# Patient Record
Sex: FEMALE | Race: WHITE | NOT HISPANIC OR LATINO | URBAN - NONMETROPOLITAN AREA
[De-identification: names, ages, dates, MRNs, and addresses within clinical notes are randomized per-mention and may not be internally consistent; named-entity substitution may affect disease eponyms.]

---

## 2025-04-17 ENCOUNTER — OFFICE VISIT (OUTPATIENT)
Dept: URGENT CARE | Age: 45
End: 2025-04-17
Payer: COMMERCIAL

## 2025-04-17 ENCOUNTER — ANCILLARY PROCEDURE (OUTPATIENT)
Dept: URGENT CARE | Age: 45
End: 2025-04-17
Payer: COMMERCIAL

## 2025-04-17 VITALS
BODY MASS INDEX: 26.66 KG/M2 | HEART RATE: 74 BPM | DIASTOLIC BLOOD PRESSURE: 78 MMHG | SYSTOLIC BLOOD PRESSURE: 109 MMHG | HEIGHT: 65 IN | TEMPERATURE: 97.7 F | OXYGEN SATURATION: 100 % | RESPIRATION RATE: 16 BRPM | WEIGHT: 160 LBS

## 2025-04-17 DIAGNOSIS — M25.531 PAIN IN RIGHT WRIST: ICD-10-CM

## 2025-04-17 DIAGNOSIS — S63.501A SPRAIN OF RIGHT WRIST, INITIAL ENCOUNTER: ICD-10-CM

## 2025-04-17 DIAGNOSIS — Q74.0 CONGENITAL POSITIVE ULNAR VARIANCE OF RIGHT WRIST: Primary | ICD-10-CM

## 2025-04-17 PROCEDURE — 73110 X-RAY EXAM OF WRIST: CPT | Mod: RIGHT SIDE

## 2025-04-17 ASSESSMENT — PAIN SCALES - GENERAL: PAINLEVEL_OUTOF10: 1

## 2025-04-17 NOTE — PATIENT INSTRUCTIONS
Follow RICE instructions.  Rotate Tylenol/ibuprofen as needed for pain following dosing instructions.  If symptoms worsen we develop any new concerning worrisome symptoms such as but not limited to numbness/weakness/pain out of proportion please go to the ER.  Call referral number today or tomorrow morning to set up appointment with orthopedist for reassessment.

## 2025-04-17 NOTE — PROGRESS NOTES
"Subjective   Patient ID: Yoon Dumont is a 44 y.o. female. 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 Pain comes and goes.).    History of Present Illness  44-year-old female presents urgent care for complaint of right wrist pain started around 2 months ago when she was spotting a teenager at gymnastics helping her get up on a bar.  States there was no direct injury or trauma just that the pain began shortly after this and progressed over the next couple days.  States pain is around a 1/10 intensity currently but sometimes fluctuates up to a 4/10 intensity.  Patient has full range of motion and sensation of bilateral upper extremities.  She states the pain sometimes radiates up to her right elbow and to her right index and middle fingers.  Denies any fevers or chills, nausea vomiting or sweats or any other concerns at this time.  Radiologist impression of the right wrist x-ray states \"The 2nd point impression should read as follows: \"Positive ulnar variance measuring 2 mm which could be associated triangular fibrocartilage complex degenerative tears and ulnocarpal impaction syndrome. No osseous changes of the latter identified radiographically.\".  Placed in splint.  Ortho referral.  RICE instructions, rotate Tylenol/ibuprofen for additional instructions as needed for pain.  ER precautions.  Patient agrees with plan.            Past Medical History  Allergies as of 04/17/2025    (No Known Allergies)       Prescriptions Prior to Admission[1]     Medical History[2]    Surgical History[3]     reports that she has never smoked. She has never used smokeless tobacco. She reports current alcohol use. She reports that she does not use drugs.    Review of Systems  Review of Systems   All other systems reviewed and are negative.                                 Objective    Vitals:    04/17/25 1634   Weight: 72.6 kg (160 lb)   Height: 1.651 m (5' 5\") "     Patient's last menstrual period was 03/24/2025.    Physical Exam  Vitals reviewed.   Constitutional:       General: She is not in acute distress.     Appearance: Normal appearance. She is not ill-appearing, toxic-appearing or diaphoretic.   HENT:      Head: Normocephalic and atraumatic.      Nose: Nose normal.   Cardiovascular:      Rate and Rhythm: Normal rate and regular rhythm.   Pulmonary:      Effort: Pulmonary effort is normal. No respiratory distress.   Musculoskeletal:      Cervical back: Normal range of motion and neck supple.      Comments: No obvious bony deformity or overlying skin changes to the right wrist/hand/forearm.  Tenderness greatest at the right ulnar styloid.  Bilateral median, ulnar, radial nerves intact motor and sensory.  Bilateral radial pulses intact and symmetrical.  Equal  strength bilaterally.  No tenderness to the mid or proximal right forearm/elbow or shoulder.  Left upper extremity unremarkable.   Skin:     General: Skin is warm and dry.   Neurological:      General: No focal deficit present.      Mental Status: She is alert and oriented to person, place, and time.   Psychiatric:         Mood and Affect: Mood normal.         Behavior: Behavior normal.         Procedures    Point of Care Test & Imaging Results from this visit  No results found for this visit on 04/17/25.   Imaging  No results found.    Cardiology, Vascular, and Other Imaging  No other imaging results found for the past 2 days      Diagnostic study results (if any) were reviewed by Jaimie Potts PA-C.    Assessment/Plan   Allergies, medications, history, and pertinent labs/EKGs/Imaging reviewed by Jaimie Potts PA-C.     Medical Decision Making  44-year-old female presents urgent care for complaint of right wrist pain started around 2 months ago when she was spotting a teenager at gymnastics helping her get up on a bar.  States there was no direct injury or trauma just that the pain began shortly  "after this and progressed over the next couple days.  States pain is around a 1/10 intensity currently but sometimes fluctuates up to a 4/10 intensity.  Patient has full range of motion and sensation of bilateral upper extremities.  She states the pain sometimes radiates up to her right elbow and to her right index and middle fingers.  Denies any fevers or chills, nausea vomiting or sweats or any other concerns at this time.  Radiologist impression of the right wrist x-ray states \"The 2nd point impression should read as follows: \"Positive ulnar variance measuring 2 mm which could be associated triangular fibrocartilage complex degenerative tears and ulnocarpal impaction syndrome. No osseous changes of the latter identified radiographically.\".  Placed in splint.  Ortho referral.  RICE instructions, rotate Tylenol/ibuprofen for additional instructions as needed for pain.  ER precautions.  Patient agrees with plan.    Orders and Diagnoses  There are no diagnoses linked to this encounter.    Medical Admin Record      Patient disposition: Home    Electronically signed by Jaimie Potts PA-C  4:36 PM           [1] (Not in a hospital admission)  [2]   Past Medical History:  Diagnosis Date    Encounter for gynecological examination (general) (routine) without abnormal findings 04/27/2018    Well female exam with routine gynecological exam    Encounter for other general counseling and advice on procreation 11/09/2018    Infertility counseling    Encounter for pregnancy test, result unknown     Encounter for pregnancy test    Encounter for screening for malignant neoplasm of cervix 04/30/2018    Cervical cancer screening    Myalgia, other site 05/19/2016    Myofascial pain syndrome    Other muscle spasm 05/19/2016    Muscle spasm    Other specified disorders of bone, shoulder 12/03/2015    Collar bone pain    Other specified mononeuropathies of right upper limb 05/19/2016    Neuropathy of right suprascapular nerve    " Pain in right shoulder 05/19/2016    Trigger point of right shoulder region    Personal history of other diseases of the female genital tract 10/16/2018    History of irregular menstrual cycles    Personal history of other diseases of the female genital tract 10/16/2018    History of intermenstrual bleeding    Personal history of other diseases of the female genital tract 06/01/2017    History of female infertility    Personal history of other specified conditions     History of abnormal Pap smear    Radiculopathy, cervical region 05/19/2016    Cervical radiculopathy   [3]   Past Surgical History:  Procedure Laterality Date    COLPOSCOPY  04/26/2018    Colposcopy    OTHER SURGICAL HISTORY  09/17/2021    Hand surgery

## 2025-05-07 ENCOUNTER — APPOINTMENT (OUTPATIENT)
Dept: ORTHOPEDIC SURGERY | Facility: CLINIC | Age: 45
End: 2025-05-07
Payer: COMMERCIAL

## 2025-05-08 ENCOUNTER — OFFICE VISIT (OUTPATIENT)
Dept: ORTHOPEDIC SURGERY | Facility: CLINIC | Age: 45
End: 2025-05-08
Payer: COMMERCIAL

## 2025-05-08 DIAGNOSIS — M25.531 RIGHT WRIST PAIN: ICD-10-CM

## 2025-05-08 PROCEDURE — 99213 OFFICE O/P EST LOW 20 MIN: CPT | Performed by: ORTHOPAEDIC SURGERY

## 2025-05-08 PROCEDURE — 99203 OFFICE O/P NEW LOW 30 MIN: CPT | Performed by: ORTHOPAEDIC SURGERY

## 2025-05-08 PROCEDURE — 1036F TOBACCO NON-USER: CPT | Performed by: ORTHOPAEDIC SURGERY

## 2025-05-08 ASSESSMENT — PAIN SCALES - GENERAL: PAINLEVEL_OUTOF10: 3

## 2025-05-08 ASSESSMENT — PAIN - FUNCTIONAL ASSESSMENT: PAIN_FUNCTIONAL_ASSESSMENT: 0-10

## 2025-05-08 NOTE — PROGRESS NOTES
History of Present Illness:  Chief Complaint   Patient presents with    Right Wrist - Pain     44-year-old female presents for evaluation of right wrist pain that has been ongoing for approximately 3 months.  Pain seem to start about 1 day after she was spotting a teenager at gymnastics to get on a bar.  Pain is intermittent and seems somewhat sporadic.  Often worse with loading and direct pressure.  No numbness or tingling.  She has tried bracing, but this seems to agitate her symptoms more.    Medical History[1]    Medication Documentation Review Audit       Reviewed by Ju Valderrama CMA (Medical Assistant) on 05/08/25 at 0921      Medication Order Taking? Sig Documenting Provider Last Dose Status            No Medications to Display                                   RX Allergies[2]    Social History     Socioeconomic History    Marital status:      Spouse name: Not on file    Number of children: Not on file    Years of education: Not on file    Highest education level: Not on file   Occupational History    Not on file   Tobacco Use    Smoking status: Never    Smokeless tobacco: Never   Vaping Use    Vaping status: Never Used   Substance and Sexual Activity    Alcohol use: Yes    Drug use: Never    Sexual activity: Defer   Other Topics Concern    Not on file   Social History Narrative    Not on file     Social Drivers of Health     Financial Resource Strain: Not on file   Food Insecurity: Not on file   Transportation Needs: Not on file   Physical Activity: Not on file   Stress: Not on file   Social Connections: Not on file   Intimate Partner Violence: Not on file   Housing Stability: Not on file       Surgical History[3]     Review of Systems   GENERAL: Negative for malaise, significant weight loss, fever  MUSCULOSKELETAL: see HPI  NEURO:  Negative     Physical Examination  Constitutional: Appears well-developed and well-nourished.  Head: Normocephalic and atraumatic.  Eyes: EOMI grossly  Cardiovascular:  Intact distal pulses.   Respiratory: Effort normal. No respiratory distress.  Neurologic: Alert and oriented to person, place, and time.  Skin: Skin is warm and dry.  Hematologic / Lymphatic: No lymphedema, lymphangitis.  Psychiatric: normal mood and affect. Behavior is normal.   Musculoskeletal:  Right wrist: Skin intact.  70/70 degrees wrist flexion/extension.  80/80 degrees pronation/supination.  Mildly positive HAKEEM.  Tenderness about dorsal/ulnar wrist as well in region of DRUJ no gross instability with scaphoid shift.  Negative DRUJ/TFCC.  Some guarding with midcarpal stability testing.  Sensation intact distally.  2+ radial pulse.  Negative Tinel's at level carpal tunnel.  Negative Durkan's.    Radiographs: Right wrist radiographs from April 17, 2025 available for review: No fracture/dislocation.  Joint spaces relatively well-maintained.     Assessment:  Patient with right wrist pain that has failed response to conservative treatment with bracing     Plan:  We discussed potential etiologies of her symptoms including ulnar abutment versus soft tissue injury.  Given the chronicity of her symptoms as well as failure to respond to conservative treatment MRI has been ordered for further review.  She will follow-up in clinic after completion of testing for further review and management planning.                [1]   Past Medical History:  Diagnosis Date    Encounter for gynecological examination (general) (routine) without abnormal findings 04/27/2018    Well female exam with routine gynecological exam    Encounter for other general counseling and advice on procreation 11/09/2018    Infertility counseling    Encounter for pregnancy test, result unknown     Encounter for pregnancy test    Encounter for screening for malignant neoplasm of cervix 04/30/2018    Cervical cancer screening    Myalgia, other site 05/19/2016    Myofascial pain syndrome    Other muscle spasm 05/19/2016    Muscle spasm    Other specified  disorders of bone, shoulder 12/03/2015    Collar bone pain    Other specified mononeuropathies of right upper limb 05/19/2016    Neuropathy of right suprascapular nerve    Pain in right shoulder 05/19/2016    Trigger point of right shoulder region    Personal history of other diseases of the female genital tract 10/16/2018    History of irregular menstrual cycles    Personal history of other diseases of the female genital tract 10/16/2018    History of intermenstrual bleeding    Personal history of other diseases of the female genital tract 06/01/2017    History of female infertility    Personal history of other specified conditions     History of abnormal Pap smear    Radiculopathy, cervical region 05/19/2016    Cervical radiculopathy   [2] No Known Allergies  [3]   Past Surgical History:  Procedure Laterality Date    COLPOSCOPY  04/26/2018    Colposcopy    OTHER SURGICAL HISTORY  09/17/2021    Hand surgery

## 2025-05-22 ENCOUNTER — APPOINTMENT (OUTPATIENT)
Dept: RADIOLOGY | Facility: HOSPITAL | Age: 45
End: 2025-05-22
Payer: COMMERCIAL